# Patient Record
Sex: MALE | Race: WHITE | NOT HISPANIC OR LATINO | Employment: FULL TIME | URBAN - METROPOLITAN AREA
[De-identification: names, ages, dates, MRNs, and addresses within clinical notes are randomized per-mention and may not be internally consistent; named-entity substitution may affect disease eponyms.]

---

## 2023-07-07 ENCOUNTER — OFFICE VISIT (OUTPATIENT)
Dept: PRIMARY CARE | Facility: CLINIC | Age: 33
End: 2023-07-07
Payer: COMMERCIAL

## 2023-07-07 ENCOUNTER — LAB (OUTPATIENT)
Dept: LAB | Facility: LAB | Age: 33
End: 2023-07-07
Payer: COMMERCIAL

## 2023-07-07 VITALS
RESPIRATION RATE: 18 BRPM | WEIGHT: 315 LBS | DIASTOLIC BLOOD PRESSURE: 102 MMHG | OXYGEN SATURATION: 97 % | HEIGHT: 75 IN | SYSTOLIC BLOOD PRESSURE: 146 MMHG | HEART RATE: 114 BPM | BODY MASS INDEX: 39.17 KG/M2

## 2023-07-07 DIAGNOSIS — Z76.89 ESTABLISHING CARE WITH NEW DOCTOR, ENCOUNTER FOR: Primary | ICD-10-CM

## 2023-07-07 DIAGNOSIS — Z00.00 WELLNESS EXAMINATION: ICD-10-CM

## 2023-07-07 DIAGNOSIS — I10 PRIMARY HYPERTENSION: ICD-10-CM

## 2023-07-07 DIAGNOSIS — E55.9 VITAMIN D DEFICIENCY: ICD-10-CM

## 2023-07-07 DIAGNOSIS — E03.9 HYPOTHYROIDISM, UNSPECIFIED TYPE: Primary | ICD-10-CM

## 2023-07-07 DIAGNOSIS — E66.01 CLASS 3 SEVERE OBESITY DUE TO EXCESS CALORIES WITH SERIOUS COMORBIDITY AND BODY MASS INDEX (BMI) OF 50.0 TO 59.9 IN ADULT (MULTI): ICD-10-CM

## 2023-07-07 PROBLEM — F15.11: Status: ACTIVE | Noted: 2023-07-07

## 2023-07-07 PROBLEM — E66.813 CLASS 3 SEVERE OBESITY DUE TO EXCESS CALORIES WITH SERIOUS COMORBIDITY AND BODY MASS INDEX (BMI) OF 50.0 TO 59.9 IN ADULT: Status: ACTIVE | Noted: 2023-07-07

## 2023-07-07 LAB
ALANINE AMINOTRANSFERASE (SGPT) (U/L) IN SER/PLAS: 35 U/L (ref 10–52)
ALBUMIN (G/DL) IN SER/PLAS: 4.5 G/DL (ref 3.4–5)
ALKALINE PHOSPHATASE (U/L) IN SER/PLAS: 103 U/L (ref 33–120)
ANION GAP IN SER/PLAS: 13 MMOL/L (ref 10–20)
ASPARTATE AMINOTRANSFERASE (SGOT) (U/L) IN SER/PLAS: 23 U/L (ref 9–39)
BILIRUBIN TOTAL (MG/DL) IN SER/PLAS: 0.7 MG/DL (ref 0–1.2)
CALCIUM (MG/DL) IN SER/PLAS: 9.7 MG/DL (ref 8.6–10.3)
CARBON DIOXIDE, TOTAL (MMOL/L) IN SER/PLAS: 27 MMOL/L (ref 21–32)
CHLORIDE (MMOL/L) IN SER/PLAS: 100 MMOL/L (ref 98–107)
CHOLESTEROL (MG/DL) IN SER/PLAS: 193 MG/DL (ref 0–199)
CHOLESTEROL IN HDL (MG/DL) IN SER/PLAS: 38.7 MG/DL
CHOLESTEROL/HDL RATIO: 5
CREATININE (MG/DL) IN SER/PLAS: 0.68 MG/DL (ref 0.5–1.3)
ERYTHROCYTE DISTRIBUTION WIDTH (RATIO) BY AUTOMATED COUNT: 13.5 % (ref 11.5–14.5)
ERYTHROCYTE MEAN CORPUSCULAR HEMOGLOBIN CONCENTRATION (G/DL) BY AUTOMATED: 31.6 G/DL (ref 32–36)
ERYTHROCYTE MEAN CORPUSCULAR VOLUME (FL) BY AUTOMATED COUNT: 92 FL (ref 80–100)
ERYTHROCYTES (10*6/UL) IN BLOOD BY AUTOMATED COUNT: 5.38 X10E12/L (ref 4.5–5.9)
GFR MALE: >90 ML/MIN/1.73M2
GLUCOSE (MG/DL) IN SER/PLAS: 94 MG/DL (ref 74–99)
HEMATOCRIT (%) IN BLOOD BY AUTOMATED COUNT: 49.3 % (ref 41–52)
HEMOGLOBIN (G/DL) IN BLOOD: 15.6 G/DL (ref 13.5–17.5)
LDL: 126 MG/DL (ref 0–99)
LEUKOCYTES (10*3/UL) IN BLOOD BY AUTOMATED COUNT: 10.1 X10E9/L (ref 4.4–11.3)
PLATELETS (10*3/UL) IN BLOOD AUTOMATED COUNT: 363 X10E9/L (ref 150–450)
POTASSIUM (MMOL/L) IN SER/PLAS: 4 MMOL/L (ref 3.5–5.3)
PROTEIN TOTAL: 8.7 G/DL (ref 6.4–8.2)
SODIUM (MMOL/L) IN SER/PLAS: 136 MMOL/L (ref 136–145)
THYROTROPIN (MIU/L) IN SER/PLAS BY DETECTION LIMIT <= 0.05 MIU/L: 5.45 MIU/L (ref 0.44–3.98)
THYROXINE (T4) FREE (NG/DL) IN SER/PLAS: 1.02 NG/DL (ref 0.61–1.12)
TRIGLYCERIDE (MG/DL) IN SER/PLAS: 143 MG/DL (ref 0–149)
UREA NITROGEN (MG/DL) IN SER/PLAS: 15 MG/DL (ref 6–23)
VLDL: 29 MG/DL (ref 0–40)

## 2023-07-07 PROCEDURE — 84153 ASSAY OF PSA TOTAL: CPT

## 2023-07-07 PROCEDURE — 3080F DIAST BP >= 90 MM HG: CPT

## 2023-07-07 PROCEDURE — 80061 LIPID PANEL: CPT

## 2023-07-07 PROCEDURE — 84439 ASSAY OF FREE THYROXINE: CPT

## 2023-07-07 PROCEDURE — 36415 COLL VENOUS BLD VENIPUNCTURE: CPT

## 2023-07-07 PROCEDURE — 3077F SYST BP >= 140 MM HG: CPT

## 2023-07-07 PROCEDURE — 84154 ASSAY OF PSA FREE: CPT

## 2023-07-07 PROCEDURE — 82306 VITAMIN D 25 HYDROXY: CPT

## 2023-07-07 PROCEDURE — 3008F BODY MASS INDEX DOCD: CPT

## 2023-07-07 PROCEDURE — 85027 COMPLETE CBC AUTOMATED: CPT

## 2023-07-07 PROCEDURE — 1036F TOBACCO NON-USER: CPT

## 2023-07-07 PROCEDURE — 84443 ASSAY THYROID STIM HORMONE: CPT

## 2023-07-07 PROCEDURE — 99385 PREV VISIT NEW AGE 18-39: CPT

## 2023-07-07 PROCEDURE — 80053 COMPREHEN METABOLIC PANEL: CPT

## 2023-07-07 RX ORDER — CHLORTHALIDONE 25 MG/1
25 TABLET ORAL DAILY
Qty: 30 TABLET | Refills: 0 | Status: SHIPPED | OUTPATIENT
Start: 2023-07-07 | End: 2023-08-08 | Stop reason: SDUPTHER

## 2023-07-07 ASSESSMENT — ENCOUNTER SYMPTOMS
HYPERACTIVE: 0
HEMATURIA: 0
NUMBNESS: 0
WOUND: 0
ORTHOPNEA: 0
FREQUENCY: 0
WEAKNESS: 0
BACK PAIN: 0
FATIGUE: 0
BLURRED VISION: 0
SINUS PAIN: 0
DIFFICULTY URINATING: 0
WHEEZING: 0
SHORTNESS OF BREATH: 0
DIARRHEA: 0
HYPERTENSION: 1
VOMITING: 0
NECK PAIN: 0
MYALGIAS: 0
EYE ITCHING: 0
NERVOUS/ANXIOUS: 0
COUGH: 0
VOICE CHANGE: 0
PND: 0
SPEECH DIFFICULTY: 0
BLOOD IN STOOL: 0
RHINORRHEA: 0
HEADACHES: 0
SWEATS: 0
DYSPHORIC MOOD: 0
FEVER: 0
PALPITATIONS: 0
EYE PAIN: 0
JOINT SWELLING: 0
EYE DISCHARGE: 0
AGITATION: 0
ABDOMINAL PAIN: 0
DIZZINESS: 0
SORE THROAT: 0
SLEEP DISTURBANCE: 0
SINUS PRESSURE: 0
CHEST TIGHTNESS: 0
CONSTIPATION: 0

## 2023-07-07 ASSESSMENT — PATIENT HEALTH QUESTIONNAIRE - PHQ9
SUM OF ALL RESPONSES TO PHQ9 QUESTIONS 1 AND 2: 0
1. LITTLE INTEREST OR PLEASURE IN DOING THINGS: NOT AT ALL
2. FEELING DOWN, DEPRESSED OR HOPELESS: NOT AT ALL

## 2023-07-07 NOTE — PATIENT INSTRUCTIONS
Please get fasting labs done in the next few DAYS (nothing to eat or drink for 10 hours prior to blood draw EXCEPT black coffee and water), we will call you with the results. If you do not hear from up 2-3 business days after you had your labs drawn, please call the office at [phone]    Vaccines are important!  Yearly seasonal flu shots are recommended, high dose is indicated for patient over 65.   - Tetanus boosters should be given every 10 yrs, this also covers for diphtheria and pertussis.   - most insurances cover the 2-shot series for shingles (shingrix) after the age of 60.   - Pneumonia vaccines are given routinely at age 65, however is you have a chronic heart or lung diagnosis this may be given earlier.     Preventative cancer screens SAVE LIVES!  - Prostate cancer screening is included in yearly blood work in men over 40.   - Colon cancer screening is recommended at age  for all patients, sometimes sooner based on family history.   - other tests may be recommended if you are a smoker!     150 mins of aerobic exercise is advised for good cardiovascular health and maintain a healthy weight.     Please try to work on maintaining a healthy body weight, with a BMI close to or at a BMI 19-25.    Recommend a whole-foods, plant-based diet, filled with fresh fruits, vegetables, seeds, beans, nuts and berries.    Discussed smoking cessation/Abstinence is best.      Abstaining from the use of alcohol is best. However if you choose to drink current guidelines are 2 or less drinks per day for men and 1.5 or less per day for women (and not all saved for one night on the weekend).    Your blood pressure should be BELOW 135/85.  If your readings were high today, please consider an at home blood pressure monitor to keep a log to bring with you to your next appointment.     OF course, do not text and drive and always wear a seat belt.    Please reduce the amount of sodium in your diet (avoid canned soups, pretzels,  nuts, popcorn, ketchup/soy sause/etc., and other high-sodium foods)Get 30 minutes of aerobic exercise most days of the week (such as walking, swimming, riding a bike, etc.)Work on reaching an ideal body weight which will improve the progression or even eliminate your hypertension.Be aware of signs of stroke (which is increased in patients with extremely high blood pressure) such as facial droop, weakness on one side of the body, or slurred speech. Smoking, caffeine, alcohol, stress and some medications may make your blood pressure worse. Please try to eliminate these.    Borderline Hypertension:   · In November 2018, the American College of Cardiology and American Heart Association issued new guidelines that redefined high blood. Goal is now less than 120/70. Stage 1 high blood pressure (a diagnosis of hypertension) is now between 130 and 139 systolic or between 80 and 89 diastolic (the bottom number). Stage 2 high blood pressure is now over 140 systolic or 90 diastolic. Your reading today was in the Stage 2 range, we will have to monitor this closely to protect your brain, eyes, heart and kidneys.   · Stress can play a large roll in elevated blood pressure. Please review the 4-7-8 breath work exercise and try to perform several times per day and as needed for times of stress. You can also go to YouTube and practice this breathing exercise with Dr. Weil.   · It would also be helpful if you purchased a blood pressure cuff for you home to keep a log and bring the machine or pictures of the readings from the machines screen to you next visit.   · Magnesium 400 mg daily has shown some benefit in blood pressure reduction, as well as improves some types of chronic pain. Please titrate slowly to try to reduce loose stools.

## 2023-07-08 LAB — CALCIDIOL (25 OH VITAMIN D3) (NG/ML) IN SER/PLAS: 16 NG/ML

## 2023-07-10 LAB
PROSTATE SPECIFIC AG (NG/ML) IN SER/PLAS: 0.8 NG/ML (ref 0–4)
PROSTATE SPECIFIC AG FREE (NG/ML) IN SER/PLAS: 0.1 NG/ML
PROSTATE SPECIFIC AG FREE/PROSTATE SPECIFIC AG TOTAL IN SER/PLAS: 12 %

## 2023-07-11 RX ORDER — LEVOTHYROXINE SODIUM 50 UG/1
50 TABLET ORAL DAILY
Qty: 30 TABLET | Refills: 11 | Status: SHIPPED | OUTPATIENT
Start: 2023-07-11 | End: 2023-08-08 | Stop reason: SDUPTHER

## 2023-07-11 RX ORDER — ERGOCALCIFEROL 1.25 MG/1
50000 CAPSULE ORAL
Qty: 12 CAPSULE | Refills: 0 | Status: SHIPPED | OUTPATIENT
Start: 2023-07-11 | End: 2023-10-03

## 2023-08-08 ENCOUNTER — OFFICE VISIT (OUTPATIENT)
Dept: PRIMARY CARE | Facility: CLINIC | Age: 33
End: 2023-08-08
Payer: COMMERCIAL

## 2023-08-08 VITALS
BODY MASS INDEX: 39.17 KG/M2 | SYSTOLIC BLOOD PRESSURE: 124 MMHG | HEIGHT: 75 IN | HEART RATE: 105 BPM | WEIGHT: 315 LBS | DIASTOLIC BLOOD PRESSURE: 78 MMHG

## 2023-08-08 DIAGNOSIS — I10 PRIMARY HYPERTENSION: ICD-10-CM

## 2023-08-08 DIAGNOSIS — E03.9 HYPOTHYROIDISM, UNSPECIFIED TYPE: ICD-10-CM

## 2023-08-08 PROCEDURE — 3008F BODY MASS INDEX DOCD: CPT

## 2023-08-08 PROCEDURE — 3074F SYST BP LT 130 MM HG: CPT

## 2023-08-08 PROCEDURE — 1036F TOBACCO NON-USER: CPT

## 2023-08-08 PROCEDURE — 3078F DIAST BP <80 MM HG: CPT

## 2023-08-08 PROCEDURE — 99213 OFFICE O/P EST LOW 20 MIN: CPT

## 2023-08-08 RX ORDER — CHLORTHALIDONE 25 MG/1
25 TABLET ORAL DAILY
Qty: 30 TABLET | Refills: 11 | Status: SHIPPED | OUTPATIENT
Start: 2023-08-08 | End: 2023-10-03

## 2023-08-08 RX ORDER — LEVOTHYROXINE SODIUM 50 UG/1
50 TABLET ORAL DAILY
Qty: 30 TABLET | Refills: 11 | Status: SHIPPED | OUTPATIENT
Start: 2023-08-08 | End: 2024-01-09 | Stop reason: SDUPTHER

## 2023-08-08 ASSESSMENT — ENCOUNTER SYMPTOMS
NECK PAIN: 0
SHORTNESS OF BREATH: 1
BLURRED VISION: 0
PND: 0
HYPERTENSION: 1
PALPITATIONS: 0
HEADACHES: 0
ORTHOPNEA: 0
SWEATS: 1

## 2023-08-08 NOTE — PROGRESS NOTES
"Subjective   Patient ID: Familia Trammell is a 33 y.o. male who presents for Follow-up (Familia is here today for follow up).    Hypertension  This is a chronic problem. The current episode started more than 1 month ago. The problem has been gradually improving since onset. The problem is controlled. Associated symptoms include malaise/fatigue, shortness of breath and sweats. Pertinent negatives include no anxiety, blurred vision, chest pain, headaches, neck pain, orthopnea, palpitations, peripheral edema or PND. Agents associated with hypertension include thyroid hormones. Risk factors for coronary artery disease include obesity and male gender. Past treatments include diuretics. The current treatment provides moderate improvement. Compliance problems include diet.  There is no history of angina or CAD/MI. Identifiable causes of hypertension include a thyroid problem.        Review of Systems   Constitutional:  Positive for malaise/fatigue.   Eyes:  Negative for blurred vision.   Respiratory:  Positive for shortness of breath.    Cardiovascular:  Negative for chest pain, palpitations, orthopnea and PND.   Musculoskeletal:  Negative for neck pain.   Neurological:  Negative for headaches.       Objective   /78   Pulse 105   Ht 1.905 m (6' 3\")   Wt (!) 216 kg (476 lb)   BMI 59.50 kg/m²     Physical Exam  Vitals and nursing note reviewed.   Constitutional:       General: He is not in acute distress.     Appearance: Normal appearance. He is obese. He is not ill-appearing, toxic-appearing or diaphoretic.   Cardiovascular:      Rate and Rhythm: Normal rate.      Pulses: Normal pulses.      Heart sounds: Normal heart sounds.   Pulmonary:      Effort: Pulmonary effort is normal.      Breath sounds: Normal breath sounds.   Neurological:      Mental Status: He is alert.         Assessment/Plan   Problem List Items Addressed This Visit       Primary hypertension    Relevant Medications    chlorthalidone (Hygroton) " 25 mg tablet     Other Visit Diagnoses       Hypothyroidism, unspecified type        Relevant Medications    levothyroxine (Synthroid, Levoxyl) 50 mcg tablet             Followup in 6 months for bp, draw thyroid in early December will adjust dose of synthroid as needed at that point.

## 2023-12-13 ENCOUNTER — LAB (OUTPATIENT)
Dept: LAB | Facility: LAB | Age: 33
End: 2023-12-13
Payer: COMMERCIAL

## 2023-12-13 DIAGNOSIS — E03.9 HYPOTHYROIDISM, UNSPECIFIED TYPE: ICD-10-CM

## 2023-12-13 LAB — TSH SERPL-ACNC: 2.52 MIU/L (ref 0.44–3.98)

## 2023-12-13 PROCEDURE — 84443 ASSAY THYROID STIM HORMONE: CPT

## 2023-12-13 PROCEDURE — 36415 COLL VENOUS BLD VENIPUNCTURE: CPT

## 2024-01-09 ENCOUNTER — OFFICE VISIT (OUTPATIENT)
Dept: PRIMARY CARE | Facility: CLINIC | Age: 34
End: 2024-01-09
Payer: COMMERCIAL

## 2024-01-09 VITALS
BODY MASS INDEX: 61.12 KG/M2 | DIASTOLIC BLOOD PRESSURE: 84 MMHG | WEIGHT: 315 LBS | HEART RATE: 100 BPM | SYSTOLIC BLOOD PRESSURE: 148 MMHG | OXYGEN SATURATION: 91 %

## 2024-01-09 DIAGNOSIS — E03.9 HYPOTHYROIDISM, UNSPECIFIED TYPE: ICD-10-CM

## 2024-01-09 DIAGNOSIS — I10 PRIMARY HYPERTENSION: ICD-10-CM

## 2024-01-09 DIAGNOSIS — E66.01 CLASS 3 SEVERE OBESITY DUE TO EXCESS CALORIES WITH SERIOUS COMORBIDITY AND BODY MASS INDEX (BMI) OF 60.0 TO 69.9 IN ADULT (MULTI): Primary | ICD-10-CM

## 2024-01-09 PROCEDURE — 3077F SYST BP >= 140 MM HG: CPT

## 2024-01-09 PROCEDURE — 3008F BODY MASS INDEX DOCD: CPT

## 2024-01-09 PROCEDURE — 99213 OFFICE O/P EST LOW 20 MIN: CPT

## 2024-01-09 PROCEDURE — 1036F TOBACCO NON-USER: CPT

## 2024-01-09 PROCEDURE — 3079F DIAST BP 80-89 MM HG: CPT

## 2024-01-09 RX ORDER — LEVOTHYROXINE SODIUM 75 UG/1
75 TABLET ORAL DAILY
Qty: 30 TABLET | Refills: 11 | Status: SHIPPED | OUTPATIENT
Start: 2024-01-09 | End: 2025-01-08

## 2024-01-09 RX ORDER — CHLORTHALIDONE 50 MG/1
50 TABLET ORAL DAILY
Qty: 90 TABLET | Refills: 3 | Status: SHIPPED | OUTPATIENT
Start: 2024-01-09 | End: 2024-04-25 | Stop reason: SDUPTHER

## 2024-01-09 ASSESSMENT — ENCOUNTER SYMPTOMS
SWEATS: 0
WEIGHT GAIN: 1
ORTHOPNEA: 0
DIARRHEA: 0
DRY SKIN: 0
HYPERTENSION: 1
SHORTNESS OF BREATH: 0
FATIGUE: 1
HAIR LOSS: 0
HEADACHES: 0
DEPRESSED MOOD: 1
PALPITATIONS: 0
CONSTIPATION: 0
WEIGHT LOSS: 0

## 2024-01-09 ASSESSMENT — PATIENT HEALTH QUESTIONNAIRE - PHQ9
10. IF YOU CHECKED OFF ANY PROBLEMS, HOW DIFFICULT HAVE THESE PROBLEMS MADE IT FOR YOU TO DO YOUR WORK, TAKE CARE OF THINGS AT HOME, OR GET ALONG WITH OTHER PEOPLE: SOMEWHAT DIFFICULT
SUM OF ALL RESPONSES TO PHQ9 QUESTIONS 1 AND 2: 2
1. LITTLE INTEREST OR PLEASURE IN DOING THINGS: SEVERAL DAYS
2. FEELING DOWN, DEPRESSED OR HOPELESS: SEVERAL DAYS

## 2024-01-09 NOTE — PROGRESS NOTES
Subjective   Patient ID: Familia Trammell is a 33 y.o. male who presents for Follow-up (/110).    Subjective  Familia Trammell is a 33 y.o. male here for discussion regarding weight loss. He has noted a weight gain of approximately 40 pounds over the last 4 months. He feels ideal weight is 250 pounds. Weight at graduation from high school was unknown, but a lot pounds. History of eating disorders: none. There is a family history positive for obesity in the patient, mother, father, and brother. Previous treatments for obesity include self-directed dieting. Obesity associated medical conditions: depression, hypertension, and thyroid disease. Obesity associated medications: none. Cardiovascular risk factors besides obesity: hypertension, male gender, and obesity (BMI >= 30 kg/m2).      Thyroid Problem  Presents for follow-up visit. Symptoms include cold intolerance, depressed mood, fatigue and weight gain. Patient reports no constipation, diarrhea, dry skin, hair loss, leg swelling, menstrual problem, palpitations or weight loss. The symptoms have been worsening.   Hypertension  This is a chronic problem. The current episode started more than 1 year ago. The problem has been gradually worsening since onset. The problem is uncontrolled. Associated symptoms include malaise/fatigue and peripheral edema. Pertinent negatives include no anxiety, headaches, orthopnea, palpitations, shortness of breath or sweats. Agents associated with hypertension include thyroid hormones. Risk factors for coronary artery disease include obesity and male gender. Past treatments include diuretics. The current treatment provides mild improvement. Identifiable causes of hypertension include a thyroid problem.        Review of Systems   Constitutional:  Positive for fatigue, malaise/fatigue and weight gain. Negative for weight loss.   Respiratory:  Negative for shortness of breath.    Cardiovascular:  Negative for palpitations and  orthopnea.   Gastrointestinal:  Negative for constipation and diarrhea.   Endocrine: Positive for cold intolerance.   Genitourinary:  Negative for menstrual problem.   Neurological:  Negative for headaches.       Objective   /84   Pulse 100   Wt (!) 222 kg (489 lb)   SpO2 91%   BMI 61.12 kg/m²     Physical Exam    Assessment/Plan   Problem List Items Addressed This Visit             ICD-10-CM    Primary hypertension I10    Relevant Medications    chlorthalidone (Hygroton) 50 mg tablet    Class 3 severe obesity with body mass index (BMI) of 60.0 to 69.9 in adult (CMS/[facility]) - Primary E66.01, Z68.44     Other Visit Diagnoses         Codes    Hypothyroidism, unspecified type     E03.9    Relevant Medications    levothyroxine (Synthroid, Levoxyl) 75 mcg tablet    Other Relevant Orders    TSH with reflex to Free T4 if abnormal        Familia was seen today for follow-up.  Diagnoses and all orders for this visit:  Class 3 severe obesity due to excess calories with serious comorbidity and body mass index (BMI) of 60.0 to 69.9 in adult (CMS/[facility])  Comments:  sent genetic testing to uncover rare obesity  Hypothyroidism, unspecified type  -     levothyroxine (Synthroid, Levoxyl) 75 mcg tablet; Take 1 tablet (75 mcg) by mouth once daily.  -     TSH with reflex to Free T4 if abnormal; Future  Primary hypertension  -     chlorthalidone (Hygroton) 50 mg tablet; Take 1 tablet (50 mg) by mouth once daily.         Declined flu shot, will followup for bp check in 3 months, will get repeat tsh in 3 weeks after change in synthroid dose.

## 2024-02-07 ENCOUNTER — LAB (OUTPATIENT)
Dept: LAB | Facility: LAB | Age: 34
End: 2024-02-07
Payer: COMMERCIAL

## 2024-02-07 DIAGNOSIS — E03.9 HYPOTHYROIDISM, UNSPECIFIED TYPE: ICD-10-CM

## 2024-02-07 LAB — TSH SERPL-ACNC: 1.84 MIU/L (ref 0.44–3.98)

## 2024-02-07 PROCEDURE — 84443 ASSAY THYROID STIM HORMONE: CPT

## 2024-02-07 PROCEDURE — 36415 COLL VENOUS BLD VENIPUNCTURE: CPT

## 2024-04-09 ENCOUNTER — APPOINTMENT (OUTPATIENT)
Dept: PRIMARY CARE | Facility: CLINIC | Age: 34
End: 2024-04-09
Payer: COMMERCIAL

## 2024-04-25 ENCOUNTER — OFFICE VISIT (OUTPATIENT)
Dept: PRIMARY CARE | Facility: CLINIC | Age: 34
End: 2024-04-25
Payer: COMMERCIAL

## 2024-04-25 VITALS
OXYGEN SATURATION: 95 % | WEIGHT: 315 LBS | SYSTOLIC BLOOD PRESSURE: 134 MMHG | DIASTOLIC BLOOD PRESSURE: 72 MMHG | HEART RATE: 97 BPM | BODY MASS INDEX: 39.17 KG/M2 | HEIGHT: 75 IN

## 2024-04-25 DIAGNOSIS — E66.01 CLASS 3 SEVERE OBESITY DUE TO EXCESS CALORIES WITHOUT SERIOUS COMORBIDITY WITH BODY MASS INDEX (BMI) OF 50.0 TO 59.9 IN ADULT (MULTI): Primary | ICD-10-CM

## 2024-04-25 DIAGNOSIS — I10 PRIMARY HYPERTENSION: ICD-10-CM

## 2024-04-25 PROCEDURE — 3075F SYST BP GE 130 - 139MM HG: CPT

## 2024-04-25 PROCEDURE — 99213 OFFICE O/P EST LOW 20 MIN: CPT

## 2024-04-25 PROCEDURE — 1036F TOBACCO NON-USER: CPT

## 2024-04-25 PROCEDURE — 3078F DIAST BP <80 MM HG: CPT

## 2024-04-25 PROCEDURE — 3008F BODY MASS INDEX DOCD: CPT

## 2024-04-25 RX ORDER — CHLORTHALIDONE 50 MG/1
50 TABLET ORAL DAILY
Qty: 90 TABLET | Refills: 3 | Status: SHIPPED | OUTPATIENT
Start: 2024-04-25 | End: 2025-04-25

## 2024-04-25 ASSESSMENT — ENCOUNTER SYMPTOMS
HYPERTENSION: 1
SHORTNESS OF BREATH: 0
ORTHOPNEA: 0
SWEATS: 0
NECK PAIN: 0
HEADACHES: 0
PALPITATIONS: 0

## 2024-04-25 NOTE — PROGRESS NOTES
"Subjective   Patient ID: Familia Trammell is a 33 y.o. male who presents for Follow-up (3 month FUV on HTN//101).    Hypertension  This is a new problem. The current episode started more than 1 month ago. The problem is unchanged. The problem is uncontrolled. Pertinent negatives include no anxiety, chest pain, headaches, malaise/fatigue, neck pain, orthopnea, palpitations, peripheral edema, shortness of breath or sweats. Risk factors for coronary artery disease include obesity, male gender and smoking/tobacco exposure. Past treatments include diuretics.        Review of Systems   Constitutional:  Negative for malaise/fatigue.   Respiratory:  Negative for shortness of breath.    Cardiovascular:  Negative for chest pain, palpitations and orthopnea.   Musculoskeletal:  Negative for neck pain.   Neurological:  Negative for headaches.       Objective   /72   Pulse 97   Ht 1.905 m (6' 3\")   Wt (!) 209 kg (461 lb)   SpO2 95%   BMI 57.62 kg/m²     Physical Exam  Vitals and nursing note reviewed.   Constitutional:       General: He is not in acute distress.     Appearance: Normal appearance. He is obese. He is not ill-appearing.   Cardiovascular:      Pulses: Normal pulses.      Heart sounds: Normal heart sounds.   Pulmonary:      Breath sounds: Normal breath sounds.   Neurological:      Mental Status: He is alert.         Assessment/Plan   Familia was seen today for follow-up.  Diagnoses and all orders for this visit:  Class 3 severe obesity due to excess calories without serious comorbidity with body mass index (BMI) of 50.0 to 59.9 in adult (Multi)  Primary hypertension  Comments:  pt is working on weight reduction. has started walking on treadmill at gym and doing weight training.  Orders:  -     chlorthalidone (Hygroton) 50 mg tablet; Take 1 tablet (50 mg) by mouth once daily.  BMI 50.0-59.9, adult (Multi)  Comments:  pt down 20 lbs in last 3 months is working on lifestyle modification.    RTC 3 " months for weight check and bp check.

## 2024-07-26 ENCOUNTER — APPOINTMENT (OUTPATIENT)
Dept: PRIMARY CARE | Facility: CLINIC | Age: 34
End: 2024-07-26
Payer: COMMERCIAL

## 2025-05-08 DIAGNOSIS — I10 PRIMARY HYPERTENSION: ICD-10-CM

## 2025-05-08 RX ORDER — CHLORTHALIDONE 50 MG/1
50 TABLET ORAL DAILY
Qty: 30 TABLET | Refills: 0 | Status: SHIPPED | OUTPATIENT
Start: 2025-05-08 | End: 2025-06-07

## 2025-05-08 RX ORDER — CHLORTHALIDONE 50 MG/1
50 TABLET ORAL DAILY
Qty: 90 TABLET | Refills: 3 | OUTPATIENT
Start: 2025-05-08

## 2025-05-08 NOTE — TELEPHONE ENCOUNTER
Pt needs refill of chlorthalidone (at least to get him to his appt date) sent to [facility]/pharmacy #[id number] - [place], [place] - [address] [place] [address] 4/25/24  NOV 6/6/25    Pt would like to know what he needs to do if this can't be filled to get him to next appt

## 2025-06-06 ENCOUNTER — APPOINTMENT (OUTPATIENT)
Dept: PRIMARY CARE | Facility: CLINIC | Age: 35
End: 2025-06-06
Payer: COMMERCIAL

## 2025-06-06 VITALS
OXYGEN SATURATION: 94 % | DIASTOLIC BLOOD PRESSURE: 93 MMHG | HEIGHT: 75 IN | HEART RATE: 100 BPM | WEIGHT: 315 LBS | BODY MASS INDEX: 39.17 KG/M2 | SYSTOLIC BLOOD PRESSURE: 145 MMHG

## 2025-06-06 DIAGNOSIS — Z13.29 SCREENING FOR THYROID DISORDER: ICD-10-CM

## 2025-06-06 DIAGNOSIS — Z13.220 LIPID SCREENING: ICD-10-CM

## 2025-06-06 DIAGNOSIS — Z13.0 SCREENING FOR DEFICIENCY ANEMIA: ICD-10-CM

## 2025-06-06 DIAGNOSIS — I10 PRIMARY HYPERTENSION: ICD-10-CM

## 2025-06-06 DIAGNOSIS — Z13.220 SCREENING FOR LIPOID DISORDERS: ICD-10-CM

## 2025-06-06 DIAGNOSIS — Z00.00 WELLNESS EXAMINATION: Primary | ICD-10-CM

## 2025-06-06 DIAGNOSIS — Z13.1 DIABETES MELLITUS SCREENING: ICD-10-CM

## 2025-06-06 DIAGNOSIS — E03.9 HYPOTHYROIDISM, UNSPECIFIED TYPE: ICD-10-CM

## 2025-06-06 DIAGNOSIS — E55.9 VITAMIN D DEFICIENCY: ICD-10-CM

## 2025-06-06 DIAGNOSIS — E66.813 CLASS 3 SEVERE OBESITY DUE TO EXCESS CALORIES WITH SERIOUS COMORBIDITY AND BODY MASS INDEX (BMI) OF 50.0 TO 59.9 IN ADULT: ICD-10-CM

## 2025-06-06 PROCEDURE — 1036F TOBACCO NON-USER: CPT

## 2025-06-06 PROCEDURE — 3008F BODY MASS INDEX DOCD: CPT

## 2025-06-06 PROCEDURE — 99213 OFFICE O/P EST LOW 20 MIN: CPT

## 2025-06-06 PROCEDURE — 99395 PREV VISIT EST AGE 18-39: CPT

## 2025-06-06 PROCEDURE — 3080F DIAST BP >= 90 MM HG: CPT

## 2025-06-06 PROCEDURE — 3077F SYST BP >= 140 MM HG: CPT

## 2025-06-06 RX ORDER — LISINOPRIL 10 MG/1
10 TABLET ORAL DAILY
Qty: 100 TABLET | Refills: 3 | Status: SHIPPED | OUTPATIENT
Start: 2025-06-06 | End: 2026-07-11

## 2025-06-06 RX ORDER — CHLORTHALIDONE 50 MG/1
50 TABLET ORAL DAILY
Qty: 90 TABLET | Refills: 3 | Status: SHIPPED | OUTPATIENT
Start: 2025-06-06 | End: 2026-06-06

## 2025-06-06 ASSESSMENT — ENCOUNTER SYMPTOMS
BLOOD IN STOOL: 0
MYALGIAS: 0
EYE PAIN: 0
WEAKNESS: 0
NECK PAIN: 0
HYPERACTIVE: 0
WOUND: 0
PALPITATIONS: 0
HYPERTENSION: 1
VOICE CHANGE: 0
SPEECH DIFFICULTY: 0
DYSPHORIC MOOD: 0
WHEEZING: 0
HEMATURIA: 0
FREQUENCY: 0
DIFFICULTY URINATING: 0
DIARRHEA: 0
SINUS PAIN: 0
FATIGUE: 0
SHORTNESS OF BREATH: 0
CONSTIPATION: 0
NUMBNESS: 0
BACK PAIN: 0
HEADACHES: 1
NERVOUS/ANXIOUS: 0
EYE DISCHARGE: 0
ABDOMINAL PAIN: 0
JOINT SWELLING: 0
RHINORRHEA: 0
EYE ITCHING: 0
SINUS PRESSURE: 0
SLEEP DISTURBANCE: 0
CHEST TIGHTNESS: 0
COUGH: 0
DIZZINESS: 0
AGITATION: 0
FEVER: 0
VOMITING: 0
SORE THROAT: 0

## 2025-06-06 ASSESSMENT — PATIENT HEALTH QUESTIONNAIRE - PHQ9
SUM OF ALL RESPONSES TO PHQ9 QUESTIONS 1 AND 2: 0
2. FEELING DOWN, DEPRESSED OR HOPELESS: NOT AT ALL
1. LITTLE INTEREST OR PLEASURE IN DOING THINGS: NOT AT ALL

## 2025-06-06 NOTE — PROGRESS NOTES
Subjective   Patient ID: Familia Trammell is a 34 y.o. male who presents for Annual Exam (Familia Trammell is a 34 y.o. male is here today for a CPE. Patient reports No questions or concerns at this time.//).    Pt is here for annual wellness.  Reports overall health is better than he was    Do you take any herbs or supplements that were not prescribed by a doctor? no  Are you taking calcium supplements? no  Are you taking aspirin daily? no  Colonoscopy: n/a  Fasting blood work: ordered today  Last eye exam: unknown  Last dental Exam: unknown  Exercise:none  Mood:pleasant  Sleep: okay  Diet:  not great  Occupation:  steel workere  Do you have pain that bothers you in your daily life? no    1. Patient Counseling:  --Nutrition: Stressed importance of moderation in sodium/caffeine intake, saturated fat and cholesterol, caloric balance, sufficient intake of fresh fruits, vegetables, fiber, calcium, iron, and 1 mg of folate supplement per day (for females capable of pregnancy).  --Discussed the issue of estrogen replacement, calcium supplement, and the daily use of baby aspirin as appropriate per pt.  --Exercise: Stressed the importance of regular exercise.   --Substance Abuse: Discussed cessation/primary prevention of tobacco, alcohol, or other drug use; driving or other dangerous activities under the influence; availability of treatment for abuse.    --Sexuality: Discussed sexually transmitted diseases, partner selection, use of condoms, avoidance of unintended pregnancy  and contraceptive alternatives.   --Injury prevention: Discussed safety belts, safety helmets, smoke detector, smoking near bedding or upholstery.   --Dental health: Discussed importance of regular tooth brushing, flossing, and dental visits.  --Immunizations reviewed.  --Discussed benefits of colon cancer screening.  --After hours service discussed with patient  2 Discussed the patient's BMI.  The BMI is above average. The patient received  Provided instructions on dietary changes  Provided instructions on exercise because they have an above normal BMI.  3 Follow up in 3 months        Hypertension  This is a chronic problem. The current episode started more than 1 year ago. The problem is unchanged. The problem is uncontrolled. Associated symptoms include headaches and malaise/fatigue. Pertinent negatives include no anxiety, chest pain, neck pain, palpitations, peripheral edema or shortness of breath. There are no associated agents to hypertension. Risk factors for coronary artery disease include obesity, male gender, sedentary lifestyle and dyslipidemia. Past treatments include direct vasodilators. The current treatment provides moderate improvement.        Review of Systems   Constitutional:  Positive for malaise/fatigue. Negative for fatigue and fever.   HENT:  Negative for congestion, ear discharge, ear pain, nosebleeds, postnasal drip, rhinorrhea, sinus pressure, sinus pain, sore throat, tinnitus and voice change.    Eyes:  Negative for pain, discharge and itching.   Respiratory:  Negative for cough, chest tightness, shortness of breath and wheezing.    Cardiovascular:  Negative for chest pain, palpitations and leg swelling.   Gastrointestinal:  Negative for abdominal pain, blood in stool, constipation, diarrhea and vomiting.   Endocrine: Negative for cold intolerance, heat intolerance and polyuria.   Genitourinary:  Negative for difficulty urinating, frequency, hematuria, penile pain, penile swelling, scrotal swelling, testicular pain and urgency.   Musculoskeletal:  Negative for back pain, gait problem, joint swelling, myalgias and neck pain.   Skin:  Negative for rash and wound.   Neurological:  Positive for headaches. Negative for dizziness, speech difficulty, weakness and numbness.   Psychiatric/Behavioral:  Negative for agitation, dysphoric mood and sleep disturbance. The patient is not nervous/anxious and is not hyperactive.   "      Objective   BP (!) 145/93   Pulse 100   Ht 1.905 m (6' 3\")   Wt (!) 212 kg (467 lb 11.2 oz)   SpO2 94%   BMI 58.46 kg/m²     Physical Exam  Constitutional:       Appearance: Normal appearance. He is obese.   HENT:      Head: Normocephalic and atraumatic.      Right Ear: Tympanic membrane and external ear normal.      Left Ear: Tympanic membrane and external ear normal.      Nose: Nose normal.      Mouth/Throat:      Mouth: Mucous membranes are moist.      Pharynx: Oropharynx is clear. Uvula midline.   Eyes:      General: Lids are normal.      Extraocular Movements: Extraocular movements intact.      Pupils: Pupils are equal, round, and reactive to light.   Neck:      Thyroid: No thyromegaly or thyroid tenderness.   Cardiovascular:      Rate and Rhythm: Normal rate and regular rhythm.      Heart sounds: Normal heart sounds.   Pulmonary:      Effort: Pulmonary effort is normal.      Breath sounds: Normal breath sounds.   Abdominal:      General: Bowel sounds are normal.      Palpations: Abdomen is soft.      Tenderness: There is no abdominal tenderness. There is no guarding.   Musculoskeletal:         General: No swelling. Normal range of motion.      Cervical back: Normal range of motion.      Right lower leg: No edema.      Left lower leg: No edema.   Lymphadenopathy:      Head:      Right side of head: No submental, submandibular or tonsillar adenopathy.      Left side of head: No submental, submandibular or tonsillar adenopathy.      Cervical: No cervical adenopathy.   Skin:     General: Skin is warm and dry.      Capillary Refill: Capillary refill takes less than 2 seconds.      Coloration: Skin is not jaundiced.      Findings: No lesion or rash.   Neurological:      General: No focal deficit present.      Mental Status: He is alert and oriented to person, place, and time.   Psychiatric:         Mood and Affect: Mood normal.         Behavior: Behavior normal.         Thought Content: Thought content " normal.         Judgment: Judgment normal.       Assessment/Plan   Familia was seen today for annual exam.  Diagnoses and all orders for this visit:  Screening for deficiency anemia  -     CBC; Future  -     CBC  Diabetes mellitus screening  -     Comprehensive Metabolic Panel; Future  -     Comprehensive Metabolic Panel  Screening for thyroid disorder  -     TSH with reflex to Free T4 if abnormal; Future  -     TSH with reflex to Free T4 if abnormal  Lipid screening  -     Lipid Panel; Future  -     Lipid Panel  Screening for lipoid disorders  -     Lipid Panel; Future  -     Lipid Panel  Vitamin D deficiency  -     Vitamin D 25-Hydroxy,Total (for eval of Vitamin D levels); Future  -     Vitamin D 25-Hydroxy,Total (for eval of Vitamin D levels)  Primary hypertension  -     CBC; Future  -     Comprehensive Metabolic Panel; Future  -     TSH with reflex to Free T4 if abnormal; Future  -     Lipid Panel; Future  -     CBC  -     Comprehensive Metabolic Panel  -     TSH with reflex to Free T4 if abnormal  -     Lipid Panel  -     chlorthalidone (Hygroton) 50 mg tablet; Take 1 tablet (50 mg) by mouth once daily.  -     lisinopril 10 mg tablet; Take 1 tablet (10 mg) by mouth once daily.  Hypothyroidism, unspecified type  -     CBC; Future  -     Comprehensive Metabolic Panel; Future  -     TSH with reflex to Free T4 if abnormal; Future  -     CBC  -     Comprehensive Metabolic Panel  -     TSH with reflex to Free T4 if abnormal  Primary hypertension  Comments:  pt is working on weight reduction. has started walking on treadmill at gym and doing weight training.  Orders:  -     CBC; Future  -     Comprehensive Metabolic Panel; Future  -     TSH with reflex to Free T4 if abnormal; Future  -     Lipid Panel; Future  -     CBC  -     Comprehensive Metabolic Panel  -     TSH with reflex to Free T4 if abnormal  -     Lipid Panel  -     chlorthalidone (Hygroton) 50 mg tablet; Take 1 tablet (50 mg) by mouth once daily.  -      lisinopril 10 mg tablet; Take 1 tablet (10 mg) by mouth once daily.  Other orders  -     Follow Up In Primary Care - Established; Future

## 2025-09-12 ENCOUNTER — APPOINTMENT (OUTPATIENT)
Dept: PRIMARY CARE | Facility: CLINIC | Age: 35
End: 2025-09-12
Payer: COMMERCIAL